# Patient Record
Sex: MALE | Race: WHITE | Employment: FULL TIME | URBAN - METROPOLITAN AREA
[De-identification: names, ages, dates, MRNs, and addresses within clinical notes are randomized per-mention and may not be internally consistent; named-entity substitution may affect disease eponyms.]

---

## 2021-07-13 ENCOUNTER — TRANSCRIBE ORDER (OUTPATIENT)
Dept: SCHEDULING | Age: 56
End: 2021-07-13

## 2021-07-13 DIAGNOSIS — R97.20 ELEVATED PSA: Primary | ICD-10-CM

## 2021-07-29 ENCOUNTER — HOSPITAL ENCOUNTER (OUTPATIENT)
Dept: MRI IMAGING | Age: 56
Discharge: HOME | End: 2021-07-29
Attending: UROLOGY
Payer: COMMERCIAL

## 2021-07-29 DIAGNOSIS — R97.20 ELEVATED PSA: ICD-10-CM

## 2021-07-29 PROCEDURE — 74011250636 HC RX REV CODE- 250/636

## 2021-07-29 PROCEDURE — 72197 MRI PELVIS W/O & W/DYE: CPT

## 2021-07-29 PROCEDURE — 74011000258 HC RX REV CODE- 258: Performed by: UROLOGY

## 2021-07-29 PROCEDURE — A9585 GADOBUTROL INJECTION: HCPCS

## 2021-07-29 RX ORDER — SODIUM CHLORIDE 0.9 % (FLUSH) 0.9 %
10 SYRINGE (ML) INJECTION
Status: COMPLETED | OUTPATIENT
Start: 2021-07-29 | End: 2021-07-29

## 2021-07-29 RX ADMIN — Medication 10 ML: at 12:34

## 2021-07-29 RX ADMIN — SODIUM CHLORIDE 100 ML: 900 INJECTION, SOLUTION INTRAVENOUS at 12:35

## 2021-07-29 RX ADMIN — GADOBUTROL 9 ML: 604.72 INJECTION INTRAVENOUS at 12:34

## 2024-07-17 ENCOUNTER — HOSPITAL ENCOUNTER (OUTPATIENT)
Facility: HOSPITAL | Age: 59
Discharge: HOME | End: 2024-07-20

## 2024-07-17 ENCOUNTER — CLINICAL DOCUMENTATION (OUTPATIENT)
Facility: HOSPITAL | Age: 59
End: 2024-07-17

## 2024-07-17 VITALS
HEART RATE: 70 BPM | SYSTOLIC BLOOD PRESSURE: 117 MMHG | DIASTOLIC BLOOD PRESSURE: 63 MMHG | BODY MASS INDEX: 26.31 KG/M2 | WEIGHT: 205 LBS | HEIGHT: 74 IN | RESPIRATION RATE: 16 BRPM

## 2024-07-17 DIAGNOSIS — C61 PROSTATE CANCER (HCC): Primary | ICD-10-CM

## 2024-07-17 ASSESSMENT — PAIN SCALES - GENERAL: PAINLEVEL_OUTOF10: 0

## 2024-07-17 NOTE — CONSULTS
specifically assessed or validated in the AS population\" (Bright). Genomic tests are reasonably and commonly used in AS, however they have a mixed record in prospective studies (cf PASS study [facility] 2020 for a negative study with [employer] in [place]), so I have concerns about treating him based only on the genomic study. Overall his genomic score is not highly reassuring however I think it is reasonable to consider a course of AS if the patient understands can reliably attend to visits and understands the plan. He states he will discuss with Dr Chavarria in August before deciding.   -External beam radiation, without androgen deprivation therapy   -Brachytherapy, potentially an option, although I don't favor LDR brachytherapy as his best option given the anterior apical transitional zone lesion.   -Prostatectomy, if offered       We discussed the nature and rationale of radiotherapy for the treatment of prostate cancer. We reviewed the radiotherapy side effects that can occur during treatment and within a few months after completing treatment. These include but are not limited to mild fatigue, bladder and urethra irritation symptoms of frequency and urgency, mild burning with urination, a small risk of tiny amounts of bleeding in the urine, and the rectum and bowel irritation symptoms of slightly looser, urgent and more frequent bowel movements, and possible small amounts of bleeding from hemorrhoids. These side effects are generally mild, are easily managed during treatment, and gradually resolve within several weeks after completion of treatment. We discussed that there is a very small risk that some side effects can persist for many months but that they resolve in the vast majority of patients. We discussed that erectile dysfunction after radiotherapy can occur in up to half of patients within 5 years after treatment. We discussed that the risk of urinary incontinence or bowel incontinence are extremely rare. Lastly,

## 2024-07-19 NOTE — PROGRESS NOTES
[facility] Distress Thermometer    Date Screening Completed: 7/17/24    Screening Declined:  [] Yes    Number that best describes how much distress you've experienced in the past week, including today?  0 [] - No distress 1 []      2 [x]      3 [x]      4 []       5 []       6 []      7 []      8 []      9 []       10 [] - Extreme distress    PROBLEM LIST  Have you had concerns about any of the items below in the past week, including today?      Physical Concerns Practical Concerns   [] Pain [] Taking care of myself    [] Sleep [] Taking care of others    [] Fatigue [] Work   [] Tobacco use  [] School   [] Substance use  [] Housing   [] Memory or concentration [] Finances   [] Sexual health [] Insurance   [] Changes in eating  [] Transportation   [] Loss or change of physical abilities  []     [] Having enough food   Emotional Concerns [] Access to medicine   [] Worry or anxiety [] Treatment decisions   [] Sadness or depression    [] Loss of interest or enjoyment  Spiritual or Rastafarian Concerns   [] Grief or loss  [] Sense of meaning or purpose   [] Fear [] Changes in florinda or beliefs   [] Loneliness  [] Death, dying, or afterlife   [] Anger [] Conflict between beliefs and cancer treatments    [] Changes in appearance [] Relationship with the sacred   [] Feelings of worthlessness or being a burden [] Ritual or dietary needs        Social Concerns     [] Relationship with spouse or partner     [] Relationship with children    [] Relationship with family members     [] Relationship with friends or coworkers     [] Communication with health care team     [] Ability to have children     [] Prejudice or discrimination        Other Concerns:     Patient received resource information and education:  [] Yes  [x] No